# Patient Record
Sex: FEMALE
[De-identification: names, ages, dates, MRNs, and addresses within clinical notes are randomized per-mention and may not be internally consistent; named-entity substitution may affect disease eponyms.]

---

## 2018-06-11 PROBLEM — Z00.00 ENCOUNTER FOR PREVENTIVE HEALTH EXAMINATION: Status: ACTIVE | Noted: 2018-06-11

## 2018-07-10 ENCOUNTER — APPOINTMENT (OUTPATIENT)
Dept: OBGYN | Facility: CLINIC | Age: 53
End: 2018-07-10
Payer: COMMERCIAL

## 2018-07-10 PROCEDURE — 99386 PREV VISIT NEW AGE 40-64: CPT

## 2018-07-10 PROCEDURE — 36415 COLL VENOUS BLD VENIPUNCTURE: CPT

## 2020-08-05 ENCOUNTER — APPOINTMENT (OUTPATIENT)
Dept: ORTHOPEDIC SURGERY | Facility: CLINIC | Age: 55
End: 2020-08-05
Payer: COMMERCIAL

## 2020-08-05 VITALS
HEART RATE: 77 BPM | TEMPERATURE: 98.2 F | BODY MASS INDEX: 27.32 KG/M2 | DIASTOLIC BLOOD PRESSURE: 68 MMHG | WEIGHT: 170 LBS | SYSTOLIC BLOOD PRESSURE: 111 MMHG | HEIGHT: 66 IN

## 2020-08-05 DIAGNOSIS — Z78.9 OTHER SPECIFIED HEALTH STATUS: ICD-10-CM

## 2020-08-05 PROCEDURE — 99203 OFFICE O/P NEW LOW 30 MIN: CPT

## 2020-08-05 NOTE — END OF VISIT
[FreeTextEntry3] : This note was written by Kristin Ott on 08/05/2020 acting solely as a scribe for Dr. Darryl Marquez.\par  \par All medical record entries made by the Scribe were at my, Dr. Darryl Marquez, direction and personally dictated by me on 08/05/2020. I have personally reviewed the chart and agree that the record accurately reflects my personal performance of the history, physical exam, assessment and plan.

## 2020-08-05 NOTE — ADDENDUM
[FreeTextEntry1] : I, Kristin Ott, acted solely as a scribe for Dr. Marquez on this date 08/05/2020.

## 2020-08-05 NOTE — PHYSICAL EXAM
[de-identified] : - Constitutional: This is a female in no obvious distress.  \par - Psych: Patient is alert and oriented to person, place and time.  Patient has a normal mood and affect.\par - Cardiovascular: Normal pulses throughout the upper extremities.  No significant varicosities are noted in the upper extremities. \par - Neuro: Strength and sensation are intact throughout the upper extremities.  Patient has normal coordination.\par - Respiratory:  Patient exhibits no evidence of shortness of breath or difficulty breathing.\par - Skin: No rashes, lesions, or other abnormalities are noted in the upper extremities.\par \par ---\par  \par Side:  Right and left\par -  There is a positive Tinel sign, Phalen's sign and Durkan's compression test at the carpal tunnel.\par -  There is normal sensation to light touch along the median nerve distribution.\par -  There is no evidence of thenar atrophy or weakness.  \par -  There is normal strength and sensation distally along the radial and ulnar nerve distributions.  \par -  There is full composite range-of-motion of the digits into the palm without triggering.  \par -  Examination of the elbow and shoulder are within normal limits.

## 2020-08-05 NOTE — HISTORY OF PRESENT ILLNESS
[Right] : right hand dominant [FreeTextEntry1] : She comes in today for evaluation of bilateral hand numbness, which began 6 weeks ago. She has seen a chiropractor, noting improvements in her symptoms following the session. She reports that her symptoms have been progressively worsening. She describes numbness and tingling in both her hands. Her symptoms are worse at night. She has been wearing bilateral wrist braces, which helps to alleviate her symptoms.

## 2020-08-05 NOTE — DISCUSSION/SUMMARY
[FreeTextEntry1] : She has findings consistent with bilateral carpal tunnel syndrome.\par \par I had a discussion regarding today's visit, the diagnosis, and treatment recommendations / options. At this time, I recommended EMGs to evaluate the extent of her carpal tunnel syndrome. I also recommended bracing. She will follow up after her EMGs to review the results and discuss treatment recommendations. \par \par The patient has agreed to this plan of management and has expressed full understanding.  All questions were fully answered to the patient's satisfaction.\par \par Over 50% of the time spent with the patient was on counseling the patient on the above diagnosis, treatment plan and prognosis.

## 2020-08-11 ENCOUNTER — APPOINTMENT (OUTPATIENT)
Dept: ORTHOPEDIC SURGERY | Facility: CLINIC | Age: 55
End: 2020-08-11
Payer: COMMERCIAL

## 2020-08-11 VITALS — WEIGHT: 170 LBS | HEIGHT: 66 IN | BODY MASS INDEX: 27.32 KG/M2

## 2020-08-11 DIAGNOSIS — M25.561 PAIN IN RIGHT KNEE: ICD-10-CM

## 2020-08-11 DIAGNOSIS — G56.01 CARPAL TUNNEL SYNDROME, RIGHT UPPER LIMB: ICD-10-CM

## 2020-08-11 DIAGNOSIS — G56.02 CARPAL TUNNEL SYNDROME, LEFT UPPER LIMB: ICD-10-CM

## 2020-08-11 PROCEDURE — 99214 OFFICE O/P EST MOD 30 MIN: CPT

## 2020-08-11 PROCEDURE — 73564 X-RAY EXAM KNEE 4 OR MORE: CPT | Mod: RT

## 2020-08-11 NOTE — PHYSICAL EXAM
[de-identified] : Right Lower Extremity\par o Hip :\par ¦ Inspection/Palpation : no tenderness, no swelling, no deformities\par ¦ Range of Motion : full and painless in all planes, no crepitus\par ¦ Stability : joint stability intact\par ¦ Strength : hip flexion 5/5\par ¦ Tests and Signs : all tests for stability normal\par \par o Knee :\par ¦ Inspection/Palpation : medial joint line tenderness to palpation, 2+ effusion, no deformity\par ¦ Range of Motion : 0 - 105 degrees, no crepitus\par ¦ Stability : no valgus or varus instability present on provocative testing, Lachman’s Test (-)\par ¦ Strength : flexion and extension 5/5\par o Muscle Bulk : normal muscle bulk present\par o Skin : no erythema, no ecchymosis\par o Sensation : sensation to pin intact\par o Vascular Exam : no edema, no cyanosis, dorsalis pedis artery pulse 2+, posterior tibial artery pulse 2+\par \par Left Lower Extremity\par o Knee :\par ¦ Inspection/Palpation : no tenderness to palpation, no swelling, no deformity\par ¦ Range of Motion : 0 -125 degrees, no crepitus\par ¦ Stability : no valgus or varus instability present on provocative testing, Lachman’s Test (-)\par ¦ Strength : flexion and extension 5/5\par o Muscle Bulk : normal muscle bulk present\par o Skin : no erythema, no ecchymosis\par o Sensation : sensation to pin intact\par o Vascular Exam : no edema, no cyanosis, dorsalis pedis artery pulse 2+, posterior tibial artery pulse 2+\par  [de-identified] : o Right Knee : AP, lateral, sunrise, and Biggs views of the knee were obtained, there are no soft tissue abnormalities, no fractures, alignment is normal, mild tricompartmental osteoarthritis, normal bone density, no bony lesions.\par \par

## 2020-08-11 NOTE — DISCUSSION/SUMMARY
[de-identified] : The underlying pathophysiology was reviewed in great detail with the patient as well as the various treatment options, including ice, analgesics, NSAIDs, Physical therapy, steroid injections.\par \par A prescription was provided for a MRI of the right knee to rule out meniscus tear. \par \par A prescription for lab work was provided to rule out an inflammatory disease process. \par \par Activity modifications and restrictions were discussed. I advised avoiding deep bending, squatting and high intensity activity. \par \par FU once imaging is obtained. \par \par All questions were answered, all alternatives discussed and the patient is in complete agreement with that plan. Follow-up appointment as instructed. Any issues and the patient will call or come in sooner.

## 2020-08-11 NOTE — HISTORY OF PRESENT ILLNESS
[de-identified] : BONITA ESCOBAR is a 55 year female presenting to the office complaining of right knee pain. She  presents to the office ambulating on her own. Patient reports pain since March 2020. Denies direct injury or trauma to the area but reports [place] the beach on [place]05/2020 when she kicked out her legs she felt a sharp pain in the posterior aspect of her bilateral legs.  The patient describes the pain as a dull aching, and occasionally sharp pain localized to the medial aspect of her right knee that is intermittent in nature. Her symptoms are exacerbated with prolonged weightbearing/walking, bending, standing from a seated position and squatting. Pain is alleviated with rest. She reports tightness of the knee and limited flexion. She reports the inability to fully flex the right knee.  She also reports some swelling of the right knee. Patient is taking NSAIDs and has been stretching for pain relief with mild relief in symptoms.  Patient denies any other complaints at this time.

## 2020-08-18 ENCOUNTER — APPOINTMENT (OUTPATIENT)
Dept: MRI IMAGING | Facility: CLINIC | Age: 55
End: 2020-08-18
Payer: COMMERCIAL

## 2020-08-18 ENCOUNTER — OUTPATIENT (OUTPATIENT)
Dept: OUTPATIENT SERVICES | Facility: HOSPITAL | Age: 55
LOS: 1 days | End: 2020-08-18
Payer: COMMERCIAL

## 2020-08-18 ENCOUNTER — RESULT REVIEW (OUTPATIENT)
Age: 55
End: 2020-08-18

## 2020-08-18 DIAGNOSIS — Z00.8 ENCOUNTER FOR OTHER GENERAL EXAMINATION: ICD-10-CM

## 2020-08-18 PROCEDURE — 73721 MRI JNT OF LWR EXTRE W/O DYE: CPT | Mod: 26,RT

## 2020-08-18 PROCEDURE — 73721 MRI JNT OF LWR EXTRE W/O DYE: CPT

## 2020-09-01 ENCOUNTER — APPOINTMENT (OUTPATIENT)
Dept: ORTHOPEDIC SURGERY | Facility: CLINIC | Age: 55
End: 2020-09-01
Payer: COMMERCIAL

## 2020-09-01 DIAGNOSIS — M23.321 OTHER MENISCUS DERANGEMENTS, POSTERIOR HORN OF MEDIAL MENISCUS, RIGHT KNEE: ICD-10-CM

## 2020-09-01 DIAGNOSIS — M25.562 PAIN IN LEFT KNEE: ICD-10-CM

## 2020-09-01 PROCEDURE — 99214 OFFICE O/P EST MOD 30 MIN: CPT

## 2020-09-01 NOTE — HISTORY OF PRESENT ILLNESS
[de-identified] : BONITA ESCOBAR is a 55 year female presenting to the office complaining of right knee pain. She  presents to the office ambulating on her own. Patient reports pain since March 2020. Denies direct injury or trauma to the area but reports swimming [place] the beach on 07/05/2020 when she kicked out her legs she felt a sharp pain in the posterior aspect of her bilateral legs.  The patient describes the pain as a dull aching, and occasionally sharp pain localized to the medial aspect of her right knee that is intermittent in nature. Her symptoms are exacerbated with prolonged weightbearing/walking, bending, standing from a seated position and squatting. Pain is alleviated with rest. She reports tightness of the knee and limited flexion. She reports the inability to fully flex the right knee.  She also reports some swelling of the right knee. Patient is taking NSAIDs and has been stretching for pain relief with mild relief in symptoms. Since last visit, patient has not completed the inflammatory lab work at this time. She reports continued pain and swelling since last visit that has decreased slightly over the past two months. She reports weakness of the leg as well. She is here today for an MRI review of the right knee. \par She also reports new onset left knee pain since last visit. She reports falling last week while walking her dog on a bent knee. She feels sharp pain localized to the medial aspct of the left knee. She has increased pain with deep flexion of the knee. Patient denies any other complaints at this time.

## 2020-09-01 NOTE — DISCUSSION/SUMMARY
[de-identified] : The underlying pathophysiology was reviewed in great detail with the patient as well as the various treatment options, including ice, analgesics, NSAIDs, Physical therapy, steroid injections, hyaluronic gel injections, bracing. \par \par MRI of the right knee was reviewed and discussed in great detail today. \par \par The patient wishes to proceed with SURGICAL INTERVENTION at this time. The risks and benefits of a RIGHT KNEE ARTHROSCOPIC MEDIAL MENISCECTOMY were discussed in great detail today, including but not limited to bleeding, infection, nerve injury, DVT, allergy to the anesthetic or re-tear, persistent pain, stiffness, scarring, swelling or deformity.\par \par Activity modifications and restrictions were discussed. I advised avoiding deep bending, squatting and high intensity activity. \par \par A prescription was provided for a MRI of the left knee to rule out meniscus tear. \par \par FU once MRI results are obtained. \par \par All questions were answered, all alternatives discussed and the patient is in complete agreement with that plan. Follow-up appointment as instructed. Any issues and the patient will call or come in sooner.

## 2020-09-01 NOTE — PHYSICAL EXAM
[de-identified] : Right Lower Extremity\par o Hip :\par ¦ Inspection/Palpation : no tenderness, no swelling, no deformities\par ¦ Range of Motion : full and painless in all planes, no crepitus\par ¦ Stability : joint stability intact\par ¦ Strength : hip flexion 5/5\par ¦ Tests and Signs : all tests for stability normal\par \par o Knee :\par ¦ Inspection/Palpation : medial joint line tenderness to palpation, 2+ effusion, no deformity\par ¦ Range of Motion : 0 - 105 degrees, no crepitus\par ¦ Stability : no valgus or varus instability present on provocative testing, Lachman’s Test (-)\par ¦ Strength : flexion and extension 5/5\par o Muscle Bulk : normal muscle bulk present\par o Skin : no erythema, no ecchymosis\par o Sensation : sensation to pin intact\par o Vascular Exam : no edema, no cyanosis, dorsalis pedis artery pulse 2+, posterior tibial artery pulse 2+\par \par Left Lower Extremity\par o Knee :\par ¦ Inspection/Palpation : medial joint line tenderness to palpation, no swelling, no deformity\par ¦ Range of Motion : 0 -125 degrees, no crepitus\par ¦ Stability : no valgus or varus instability present on provocative testing, Lachman’s Test (-)\par ¦ Strength : flexion and extension 5/5\par o Muscle Bulk : normal muscle bulk present\par o Skin : no erythema, no ecchymosis\par o Sensation : sensation to pin intact\par o Vascular Exam : no edema, no cyanosis, dorsalis pedis artery pulse 2+, posterior tibial artery pulse 2+ [de-identified] : o MRI of the right knee performed on 08/18/2020 at [facility]: Impression: \par ¦ Undersurface tear at the junction of the body and posterior horn of the medial meniscus with a small displaced flap of meniscal tissue at the medial joint line with marked medial joint line edema and reactive edema within the far medial aspect of the medial tibial plateau. \par ¦ Moderate to large joint effusion. 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 \par \par \par \par

## 2020-09-23 ENCOUNTER — APPOINTMENT (OUTPATIENT)
Dept: ORTHOPEDIC SURGERY | Facility: HOSPITAL | Age: 55
End: 2020-09-23

## 2020-10-08 ENCOUNTER — APPOINTMENT (OUTPATIENT)
Dept: NEUROLOGY | Facility: CLINIC | Age: 55
End: 2020-10-08
Payer: COMMERCIAL

## 2020-10-08 VITALS — TEMPERATURE: 97.4 F

## 2020-10-08 VITALS
SYSTOLIC BLOOD PRESSURE: 102 MMHG | HEIGHT: 66 IN | BODY MASS INDEX: 26.84 KG/M2 | HEART RATE: 72 BPM | DIASTOLIC BLOOD PRESSURE: 60 MMHG | WEIGHT: 167 LBS

## 2020-10-08 PROCEDURE — 95910 NRV CNDJ TEST 7-8 STUDIES: CPT

## 2020-10-08 PROCEDURE — 95885 MUSC TST DONE W/NERV TST LIM: CPT

## 2022-11-15 ENCOUNTER — NON-APPOINTMENT (OUTPATIENT)
Age: 57
End: 2022-11-15